# Patient Record
(demographics unavailable — no encounter records)

---

## 2024-12-03 NOTE — HISTORY OF PRESENT ILLNESS
[FreeTextEntry1] : 64-year-old lady history of dementia (approx 4 y, frontal lobe), diabetes, hyperlipidemia, bladder polyp, C section, PSBO '24  ([facility], managed medically) here for ER follow-up. Here with daughter (30 yo) who is recently her caretaker because her other sister who was the primary caretaker  in childbirth at 35 yo.  Current daughter has been the caretaker for the past 3 y - [phone] ([facility])   She went to the ER  with abdominal pain.  She is AAO x 1 at baseline.  They got a CAT scan and labs.   IMPRESSION: Thickening of the gastric antrum correlate for gastritis. Fluid distended gastric fundus and body. Colonic diverticulosis without diverticulitis. She had a completely normal CBC with a white count of 4.7, hemoglobin of 13.3, MCV of 89.3, platelets of 241.  She had a normal CMP and lipase.  She had a normal urinalysis.  regained weight after PSBO 130-140 prior now 120 lowest 100s  vomits if she eats fast  had a colonoscopy (maybe also EGD)  within the past 4 y - was told all clear

## 2024-12-03 NOTE — PLAN
[TextEntry] : We discussed briefly the possibility that she could have Crohn's or an adhesion from her  that explains the intermittent small bowel obstructions.  However, the patient has been gaining weight, has a good appetite today, so I am cautiously optimistic that no intervention is needed at this time.  I did suggest that we do a 3-month follow-up to make sure that the weight stays on track and that no new symptoms arise.  The daughter is aware that if the vomiting becomes recurrent she needs to bring her back to the emergency room.

## 2024-12-03 NOTE — ASSESSMENT
[FreeTextEntry1] : 64-year-old lady history of dementia, diabetes, hyperlipidemia, bladder polyp here for ER follow-up.  Patient had an episode of partial small bowel obstruction in 2024, documented on a CT performed in the ER at that time, which shows a transition point in the distal terminal ileum.  She did have a  29 years ago at the birth of her daughter who is currently her caretaker.  Her recent CT was significantly less impressive.  The daughter reports that she had a full meal this morning.  She does report weight loss in February with the small bowel obstruction but gradual regaining of 10 of the 20 lost pounds.  On exam today on palpation in the umbilical area the patient does wince in pain but then seems to be comfortable when not examined.

## 2024-12-03 NOTE — HISTORY OF PRESENT ILLNESS
[FreeTextEntry1] : 64-year-old lady history of dementia (approx 4 y, frontal lobe), diabetes, hyperlipidemia, bladder polyp, C section, PSBO '24  ([facility], managed medically) here for ER follow-up. Here with daughter (28 yo) who is recently her caretaker because her other sister who was the primary caretaker  in childbirth at 37 yo.  Current daughter has been the caretaker for the past 3 y - [phone] ([facility])   She went to the ER  with abdominal pain.  She is AAO x 1 at baseline.  They got a CAT scan and labs.   IMPRESSION: Thickening of the gastric antrum correlate for gastritis. Fluid distended gastric fundus and body. Colonic diverticulosis without diverticulitis. She had a completely normal CBC with a white count of 4.7, hemoglobin of 13.3, MCV of 89.3, platelets of 241.  She had a normal CMP and lipase.  She had a normal urinalysis.  regained weight after PSBO 130-140 prior now 120 lowest 100s  vomits if she eats fast  had a colonoscopy (maybe also EGD)  within the past 4 y - was told all clear